# Patient Record
Sex: FEMALE | Race: WHITE | NOT HISPANIC OR LATINO | Employment: FULL TIME | URBAN - METROPOLITAN AREA
[De-identification: names, ages, dates, MRNs, and addresses within clinical notes are randomized per-mention and may not be internally consistent; named-entity substitution may affect disease eponyms.]

---

## 2023-03-08 ENCOUNTER — LAB REQUISITION (OUTPATIENT)
Dept: LAB | Facility: CLINIC | Age: 33
End: 2023-03-08
Payer: COMMERCIAL

## 2023-03-08 DIAGNOSIS — Z01.419 ENCOUNTER FOR GYNECOLOGICAL EXAMINATION (GENERAL) (ROUTINE) WITHOUT ABNORMAL FINDINGS: ICD-10-CM

## 2023-03-08 PROCEDURE — 87624 HPV HI-RISK TYP POOLED RSLT: CPT | Mod: ORL | Performed by: NURSE PRACTITIONER

## 2023-03-08 PROCEDURE — G0145 SCR C/V CYTO,THINLAYER,RESCR: HCPCS | Mod: ORL | Performed by: NURSE PRACTITIONER

## 2023-03-10 LAB
BKR LAB AP GYN ADEQUACY: NORMAL
BKR LAB AP GYN INTERPRETATION: NORMAL
BKR LAB AP HPV REFLEX: NORMAL
BKR LAB AP LMP: NORMAL
BKR LAB AP PREVIOUS ABNL DX: NORMAL
BKR LAB AP PREVIOUS ABNORMAL: NORMAL
PATH REPORT.COMMENTS IMP SPEC: NORMAL
PATH REPORT.COMMENTS IMP SPEC: NORMAL
PATH REPORT.RELEVANT HX SPEC: NORMAL

## 2023-03-14 LAB
HUMAN PAPILLOMA VIRUS 16 DNA: NEGATIVE
HUMAN PAPILLOMA VIRUS 18 DNA: NEGATIVE
HUMAN PAPILLOMA VIRUS FINAL DIAGNOSIS: NORMAL
HUMAN PAPILLOMA VIRUS OTHER HR: NEGATIVE

## 2023-10-16 ENCOUNTER — OFFICE VISIT (OUTPATIENT)
Dept: URGENT CARE | Facility: URGENT CARE | Age: 33
End: 2023-10-16
Payer: COMMERCIAL

## 2023-10-16 VITALS
TEMPERATURE: 98.6 F | WEIGHT: 160 LBS | SYSTOLIC BLOOD PRESSURE: 120 MMHG | RESPIRATION RATE: 16 BRPM | HEART RATE: 97 BPM | DIASTOLIC BLOOD PRESSURE: 76 MMHG | BODY MASS INDEX: 24.25 KG/M2 | OXYGEN SATURATION: 98 % | HEIGHT: 68 IN

## 2023-10-16 DIAGNOSIS — J01.40 ACUTE NON-RECURRENT PANSINUSITIS: Primary | ICD-10-CM

## 2023-10-16 PROCEDURE — 99203 OFFICE O/P NEW LOW 30 MIN: CPT | Performed by: PHYSICIAN ASSISTANT

## 2023-10-16 RX ORDER — FLUTICASONE PROPIONATE 50 MCG
1 SPRAY, SUSPENSION (ML) NASAL DAILY
Qty: 18.2 ML | Refills: 0 | Status: SHIPPED | OUTPATIENT
Start: 2023-10-16

## 2023-10-16 RX ORDER — DOXYCYCLINE 100 MG/1
100 CAPSULE ORAL 2 TIMES DAILY
Qty: 20 CAPSULE | Refills: 0 | Status: SHIPPED | OUTPATIENT
Start: 2023-10-16 | End: 2023-10-26

## 2023-10-16 RX ORDER — BENZONATATE 200 MG/1
200 CAPSULE ORAL 3 TIMES DAILY PRN
Qty: 21 CAPSULE | Refills: 0 | Status: SHIPPED | OUTPATIENT
Start: 2023-10-16

## 2023-10-16 NOTE — PROGRESS NOTES
Acute non-recurrent pansinusitis  - doxycycline hyclate (VIBRAMYCIN) 100 MG capsule; Take 1 capsule (100 mg) by mouth 2 times daily for 10 days  - fluticasone (FLONASE) 50 MCG/ACT nasal spray; Spray 1 spray into both nostrils daily  - benzonatate (TESSALON) 200 MG capsule; Take 1 capsule (200 mg) by mouth 3 times daily as needed for cough    Age 12 months or more  Okay to use Zarbee's   Okay to use Rx Children Tylenol if prescribed (Dose based on weight)    Age 2-12:   Okay to use Children Motrin or Tylenol over the counter.    Adults:  Okay to take acetaminophen 500 mg- 2 tabs (Total of 1000 mg) every 8 hrs   Okay to take ibuprofen 200 mg- 3 tabs (Total of 600 mg) every 6 hours        Okay to use Neti pot for sinus lavage up to three times daily for congestion and sinus pressure if present. Daily hot shower can be beneficial for congestion and body aches. Okay to use bedroom vaporizer or humidifier if symptoms are worse at night. Nightly Vicks Vapor rub and 5-10 mg of Melatonin okay to use for sleep.     Over the counter cough medication and decongestants okay if not prescribed by me during this visit. For homeopathic alternatives to cough syrup and decongestant, feel free to try Elderberry extract.    Okay to use salt water gargles, warm tea (or warm water with lemon and honey), and lozenges for any throat discomfort. Chloraseptic spray is also highly encourages for throat pain/irritation.     Patient will need to get plenty of rest and drink at least 1.5-2 liters of fluids daily for adults and 1-1.5 liters for children. If vomiting and not tolerating liquids for more than 24 hrs, please go to your nearest emergency department for IV fluids and further treatment.     Patient is not contagious after 1 week from start of symptoms. If possible, wear mask for first 7 days. Wash hands regularly and vigorously for 30 seconds often.     Patient was advised to return to clinic for reevaluation (either UC or PCP) if  symptoms do not improve in 5 days. Patient educated on red flag symptoms and asked to go directly to the ED if these symptoms present themselves.       YISEL Mcintosh [facility]    Subjective   33 year old who presents to clinic today for the following health issues:    Urgent Care and URI       HPI     Acute Illness  Acute illness concerns: Sick since end of August but could be multiple illnesses since then. Coughing, congestion, sore throat on and off. Over past week head congestion, productive cough.   Onset/Duration: has been feeling sick off and on since August- Has been more sick in the last 10 days   Symptoms:  Fever: No  Chills/Sweats: No  Headache (location?): No  Sinus Pressure: YES  Conjunctivitis:  No  Ear Pain: YES: bilateral ear pressure but no pain   Rhinorrhea: YES  Congestion: YES  Sore Throat: No- not currently but this has been off and on over the last couple of weeks   Cough: YES  Wheeze: No  Decreased Appetite: No  Nausea: No  Vomiting: No  Diarrhea: No  Dysuria/Freq.: No  Dysuria or Hematuria: No  Fatigue/Achiness: Possibly fatigue but no body aches.   Sick/Strep Exposure: Possibly her child  Therapies tried and outcome: Dayquil and Nyquil    Review of Systems   Review of Systems   See HPI    Objective    Temp: 98.6  F (37  C) Temp src: Temporal BP: 120/76 Pulse: 97   Resp: 16 SpO2: 98 %       Physical Exam   Physical Exam  Constitutional:       General: She is not in acute distress.     Appearance: Normal appearance. She is normal weight. She is not ill-appearing, toxic-appearing or diaphoretic.   HENT:      Head: Normocephalic and atraumatic.      Right Ear: Tympanic membrane, ear canal and external ear normal. There is no impacted cerumen.      Left Ear: Tympanic membrane, ear canal and external ear normal. There is no impacted cerumen.      Nose: Congestion and rhinorrhea present.      Right Sinus: Maxillary sinus tenderness and frontal sinus tenderness present.       Left Sinus: Maxillary sinus tenderness and frontal sinus tenderness present.      Mouth/Throat:      Mouth: Mucous membranes are moist.      Pharynx: No oropharyngeal exudate or posterior oropharyngeal erythema.   Cardiovascular:      Rate and Rhythm: Normal rate and regular rhythm.      Pulses: Normal pulses.      Heart sounds: Normal heart sounds. No murmur heard.     No friction rub. No gallop.   Pulmonary:      Effort: Pulmonary effort is normal. No respiratory distress.      Breath sounds: No stridor. No wheezing, rhonchi or rales.   Chest:      Chest wall: No tenderness.   Lymphadenopathy:      Cervical: No cervical adenopathy.   Neurological:      General: No focal deficit present.      Mental Status: She is alert and oriented to person, place, and time. Mental status is at baseline.      Gait: Gait normal.   Psychiatric:         Mood and Affect: Mood normal.         Behavior: Behavior normal.         Thought Content: Thought content normal.         Judgment: Judgment normal.          No results found for this or any previous visit (from the past 24 hour(s)).

## 2024-05-19 ENCOUNTER — HEALTH MAINTENANCE LETTER (OUTPATIENT)
Age: 34
End: 2024-05-19

## 2025-03-06 ENCOUNTER — LAB REQUISITION (OUTPATIENT)
Dept: LAB | Facility: CLINIC | Age: 35
End: 2025-03-06

## 2025-03-06 DIAGNOSIS — O20.9 HEMORRHAGE IN EARLY PREGNANCY, UNSPECIFIED: ICD-10-CM

## 2025-03-06 LAB
ABO + RH BLD: NORMAL
BLD GP AB SCN SERPL QL: NEGATIVE
HCG INTACT+B SERPL-ACNC: 276 MIU/ML
HGB BLD-MCNC: 12.6 G/DL (ref 11.7–15.7)
SPECIMEN EXP DATE BLD: NORMAL

## 2025-03-06 PROCEDURE — 86900 BLOOD TYPING SEROLOGIC ABO: CPT | Performed by: OBSTETRICS & GYNECOLOGY

## 2025-03-06 PROCEDURE — 85018 HEMOGLOBIN: CPT | Performed by: OBSTETRICS & GYNECOLOGY

## 2025-03-06 PROCEDURE — 84702 CHORIONIC GONADOTROPIN TEST: CPT | Performed by: OBSTETRICS & GYNECOLOGY

## 2025-03-10 ENCOUNTER — LAB REQUISITION (OUTPATIENT)
Dept: LAB | Facility: CLINIC | Age: 35
End: 2025-03-10

## 2025-03-10 DIAGNOSIS — O20.9 HEMORRHAGE IN EARLY PREGNANCY, UNSPECIFIED: ICD-10-CM

## 2025-03-10 LAB — HCG INTACT+B SERPL-ACNC: 164 MIU/ML

## 2025-03-10 PROCEDURE — 84702 CHORIONIC GONADOTROPIN TEST: CPT | Performed by: OBSTETRICS & GYNECOLOGY

## 2025-04-21 ENCOUNTER — LAB REQUISITION (OUTPATIENT)
Dept: LAB | Facility: CLINIC | Age: 35
End: 2025-04-21

## 2025-04-21 DIAGNOSIS — Z32.01 ENCOUNTER FOR PREGNANCY TEST, RESULT POSITIVE: ICD-10-CM

## 2025-04-21 PROCEDURE — 84702 CHORIONIC GONADOTROPIN TEST: CPT | Performed by: OBSTETRICS & GYNECOLOGY

## 2025-04-22 LAB — HCG INTACT+B SERPL-ACNC: 181 MIU/ML

## 2025-04-23 ENCOUNTER — LAB REQUISITION (OUTPATIENT)
Dept: LAB | Facility: CLINIC | Age: 35
End: 2025-04-23

## 2025-04-23 DIAGNOSIS — Z32.01 ENCOUNTER FOR PREGNANCY TEST, RESULT POSITIVE: ICD-10-CM

## 2025-04-23 LAB — HCG INTACT+B SERPL-ACNC: 384 MIU/ML

## 2025-04-23 PROCEDURE — 84702 CHORIONIC GONADOTROPIN TEST: CPT | Performed by: OBSTETRICS & GYNECOLOGY

## 2025-05-20 ENCOUNTER — LAB REQUISITION (OUTPATIENT)
Dept: LAB | Facility: CLINIC | Age: 35
End: 2025-05-20

## 2025-05-20 DIAGNOSIS — Z36.89 ENCOUNTER FOR OTHER SPECIFIED ANTENATAL SCREENING: ICD-10-CM

## 2025-05-20 PROCEDURE — 87086 URINE CULTURE/COLONY COUNT: CPT | Performed by: NURSE PRACTITIONER

## 2025-05-21 LAB — BACTERIA UR CULT: NO GROWTH

## 2025-06-08 ENCOUNTER — HEALTH MAINTENANCE LETTER (OUTPATIENT)
Age: 35
End: 2025-06-08

## 2025-06-19 ENCOUNTER — LAB REQUISITION (OUTPATIENT)
Dept: LAB | Facility: CLINIC | Age: 35
End: 2025-06-19

## 2025-06-19 DIAGNOSIS — Z36.89 ENCOUNTER FOR OTHER SPECIFIED ANTENATAL SCREENING: ICD-10-CM

## 2025-06-19 LAB
ABO + RH BLD: NORMAL
BASOPHILS # BLD AUTO: 0 10E3/UL (ref 0–0.2)
BASOPHILS NFR BLD AUTO: 0 %
BLD GP AB SCN SERPL QL: NEGATIVE
EOSINOPHIL # BLD AUTO: 0.3 10E3/UL (ref 0–0.7)
EOSINOPHIL NFR BLD AUTO: 3 %
ERYTHROCYTE [DISTWIDTH] IN BLOOD BY AUTOMATED COUNT: 12.3 % (ref 10–15)
EST. AVERAGE GLUCOSE BLD GHB EST-MCNC: 103 MG/DL
HBA1C MFR BLD: 5.2 %
HCT VFR BLD AUTO: 34.3 % (ref 35–47)
HCV AB SERPL QL IA: NONREACTIVE
HGB BLD-MCNC: 11.5 G/DL (ref 11.7–15.7)
HIV 1+2 AB+HIV1 P24 AG SERPL QL IA: NONREACTIVE
IMM GRANULOCYTES # BLD: 0.1 10E3/UL
IMM GRANULOCYTES NFR BLD: 1 %
LYMPHOCYTES # BLD AUTO: 1.7 10E3/UL (ref 0.8–5.3)
LYMPHOCYTES NFR BLD AUTO: 16 %
MCH RBC QN AUTO: 29.9 PG (ref 26.5–33)
MCHC RBC AUTO-ENTMCNC: 33.5 G/DL (ref 31.5–36.5)
MCV RBC AUTO: 89 FL (ref 78–100)
MONOCYTES # BLD AUTO: 0.6 10E3/UL (ref 0–1.3)
MONOCYTES NFR BLD AUTO: 6 %
NEUTROPHILS # BLD AUTO: 7.7 10E3/UL (ref 1.6–8.3)
NEUTROPHILS NFR BLD AUTO: 74 %
NRBC # BLD AUTO: 0 10E3/UL
NRBC BLD AUTO-RTO: 0 /100
PLATELET # BLD AUTO: 288 10E3/UL (ref 150–450)
RBC # BLD AUTO: 3.85 10E6/UL (ref 3.8–5.2)
SPECIMEN EXP DATE BLD: NORMAL
WBC # BLD AUTO: 10.4 10E3/UL (ref 4–11)

## 2025-06-19 PROCEDURE — 87389 HIV-1 AG W/HIV-1&-2 AB AG IA: CPT | Performed by: OBSTETRICS & GYNECOLOGY

## 2025-06-19 PROCEDURE — 83036 HEMOGLOBIN GLYCOSYLATED A1C: CPT | Performed by: OBSTETRICS & GYNECOLOGY

## 2025-06-19 PROCEDURE — 85025 COMPLETE CBC W/AUTO DIFF WBC: CPT | Performed by: OBSTETRICS & GYNECOLOGY

## 2025-06-19 PROCEDURE — 86803 HEPATITIS C AB TEST: CPT | Performed by: OBSTETRICS & GYNECOLOGY

## 2025-06-19 PROCEDURE — 86901 BLOOD TYPING SEROLOGIC RH(D): CPT | Performed by: OBSTETRICS & GYNECOLOGY

## 2025-06-25 ENCOUNTER — TRANSCRIBE ORDERS (OUTPATIENT)
Dept: MATERNAL FETAL MEDICINE | Facility: CLINIC | Age: 35
End: 2025-06-25
Payer: COMMERCIAL

## 2025-06-25 DIAGNOSIS — O26.90 PREGNANCY RELATED CONDITION, ANTEPARTUM: Primary | ICD-10-CM

## 2025-07-23 ENCOUNTER — PRE VISIT (OUTPATIENT)
Dept: MATERNAL FETAL MEDICINE | Facility: HOSPITAL | Age: 35
End: 2025-07-23
Payer: COMMERCIAL

## 2025-07-28 ENCOUNTER — ANCILLARY PROCEDURE (OUTPATIENT)
Dept: ULTRASOUND IMAGING | Facility: HOSPITAL | Age: 35
End: 2025-07-28
Attending: OBSTETRICS & GYNECOLOGY
Payer: COMMERCIAL

## 2025-07-28 ENCOUNTER — OFFICE VISIT (OUTPATIENT)
Dept: MATERNAL FETAL MEDICINE | Facility: HOSPITAL | Age: 35
End: 2025-07-28
Attending: OBSTETRICS & GYNECOLOGY
Payer: COMMERCIAL

## 2025-07-28 DIAGNOSIS — Z82.79 FAMILY HISTORY OF CONGENITAL HEART DEFECT: Primary | ICD-10-CM

## 2025-07-28 DIAGNOSIS — Z36.3 ENCOUNTER FOR ROUTINE SCREENING FOR MALFORMATION USING ULTRASONICS: ICD-10-CM

## 2025-07-28 DIAGNOSIS — O26.90 PREGNANCY RELATED CONDITION, ANTEPARTUM: ICD-10-CM

## 2025-07-28 DIAGNOSIS — O09.512 AMA (ADVANCED MATERNAL AGE) PRIMIGRAVIDA 35+, SECOND TRIMESTER: Primary | ICD-10-CM

## 2025-07-28 PROCEDURE — 99207 PR NO CHARGE LOS: CPT | Performed by: OBSTETRICS & GYNECOLOGY

## 2025-07-28 PROCEDURE — 76811 OB US DETAILED SNGL FETUS: CPT

## 2025-07-28 PROCEDURE — 76811 OB US DETAILED SNGL FETUS: CPT | Mod: 26 | Performed by: OBSTETRICS & GYNECOLOGY

## 2025-07-28 PROCEDURE — 96041 GENETIC COUNSELING SVC EA 30: CPT | Performed by: GENETIC COUNSELOR, MS

## 2025-07-28 NOTE — PROGRESS NOTES
Please refer to ultrasound report under 'Imaging' Studies of 'Chart Review' tabs.    Raymon Lee M.D.

## 2025-07-28 NOTE — NURSING NOTE
Ela ANAMIKA Mccloud is a  at 18w1d who presents to [facility] for GC and L2 ultrasound. Pt reports positive fetal movement. Pt denies bldg/lof/change in discharge, contractions, headache, vision changes, chest pain/SOB or edema. SBAR given to Dr. Lee, see note in Epic.

## 2025-07-28 NOTE — PROGRESS NOTES
"[facility]  Genetic Counseling Consult    Patient:  Ela Mccloud  Preferred Name: Ela YOB: 1990   Date of Service:  25   MRN: [id number]    Ela was seen at the [facility] [place] for genetic consultation. The indication for genetic counseling is advanced maternal age and family history concern. The patient was accompanied to this visit by their partner, Jose.    The session was conducted in English.      IMPRESSION/ PLAN   1. Ela had genetic screening earlier in this pregnancy. Their non-invasive prenatal test was screen negative or low risk for screened conditions     2. During today's [facility] visit, Ela had a genetic counseling session only. Ela has already had genetic testing in this pregnancy. Additional screening and diagnostic testing was discussed for the gestational age and declined.    3. Ela had a level II comprehensive anatomy ultrasound today. Please see the ultrasound report for further details.    PREGNANCY HISTORY   /Parity:       Ela's pregnancy history is significant for:   10/2021: Term c section, female, no complications per patient report  1 1st trimester SAB    CURRENT PREGNANCY   Current Age: 35 year old   Age at Delivery: 35 year old  JOEL: 2025, by Last Menstrual Period                                   Gestational Age: 18w1d  This pregnancy is a single gestation.   This pregnancy was conceived spontaneously.    MEDICAL HISTORY   Ela s reported medical history is not expected to impact pregnancy management or risks to fetal development.       FAMILY HISTORY   A three-generation pedigree was obtained today and is scanned under the \"Media\" tab in Epic. The family history was reported by Ela and their partner.    The following significant findings were reported today:   The father of the pregnancy, Jose, 45, is healthy.   Ela and Jose have one " healthy daughter    Ela's sister  at 11 days old from hypoplastic left heart syndrome  Congenital heart defects can be isolated or associated with multiple birth defects (syndromic).There are many genetic syndromes, single gene disorders or chromosomal abnormalities, that can be associated with congenital heart defects. Isolated congenital heart defects are usually a multi-factorial condition caused by the combination of genetic and environmental factors and familial clustering is not uncommon. Since there is a genetic component to multi-factorial conditions, the recurrence risk is higher for first degree relatives (siblings) than in second degree relatives and decreases with distance in relationship.    We discussed that congenital heart defects are common, occurring in 0.5 to 1.0% live births. The specific recurrence risk for first degree relatives differs according to the type of congenital heart defect and if there is an associated genetic syndrome present. In general, studies show that the overall risk contributed by a positive family history of a congenital heart defect in 1st degree relatives for the same defect is 8.15% whereas the recurrence risk for a different heart defect is 2.68%. There are also recurrence risks specific to the heart defect. Often risks for second degree relatives are not available. Furthermore, for third degree relatives the risk is likely equal to general population risks.     Hypoplastic left heart syndrome (HLHS) is an underdevelopment of the heart's left side including the aorta, aortic valve, left ventricle, and the mitral valve. Since the left side of the heart is responsible for receiving oxygen-rich blood from the lungs and then pumping it out to the body through the aorta the baby may have significant problems breathing. This defect can be fatal for this reason. Significant multi-stage surgery is often needed. The risk for hypoplastic left heart sydnrome in a baby is  "dependent on the affected relative: 8.0% for first degree relatives     This pregnancy is a second degree relative to the family member with a hypoplastic left heart syndrome; therefore, the recurrence risk is likely elevated from general population but not significantly. A fetal echocardiogram is often recommended for pregnancies of a first degree relation to a family member with a congenital heart defect. Fetal echocardiograms can also be recommended, in the absence of family history, due to maternal diabetes, an IVF pregnancy, or suspected heart defect on comprehensive ultrasound, among other reasons.     Jose's maternal uncle and aunt both had major cardiac surgery for a \"thinning aorta\". Jose describes the problem as being in the heart rather than in the chest or abdominal area. Per the description, it could have been an aortic aneurysm or coarctation of the aorta. Jose's mother and himself have had normal echocardiogram. He does not believe his aunt and uncle had genetic testing. He was encouraged to ask them if they would ever consider genetic testing. If a familial risk factor was found for their condition then family members could be tested to determine if they too are at risk.     Otherwise, the reported family history is unremarkable for multiple miscarriages, stillbirths, birth defects, intellectual disabilities, known genetic conditions, and consanguinity.       RISK ASSESSMENT FOR INHERITED CONDITIONS AND CARRIER SCREENING OPTIONS   Expanded carrier screening is available to screen for autosomal recessive conditions and X-linked conditions in a large list of genes. Carrier screening does not test the pregnancy but gives a risk assessment for the pregnancy and future pregnancies to have the condition. Expanded carrier screening is designed to identify carrier status for conditions that are primarily childhood or adolescent onset. Expanded carrier screening does not evaluate for adult-onset conditions " such as hereditary cancer syndromes, dementia/ Alzheimer's disease, or cardiovascular disease risk factors. Additionally, expanded carrier screening is not comprehensive for all known genetic diseases or inherited conditions. Carrier screening does not test for all genetic and health conditions or risk factors.     Autosomal recessive conditions happen when a mutation has been inherited from the egg and sperm and include conditions like cystic fibrosis, thalassemia, hearing loss, spinal muscular atrophy, and more. We reviewed that when both biological parents carry a harmful genetic change in a gene associated with autosomal recessive inheritance, each of their pregnancies has a 1 in 4 (25%) chance to be affected by that condition. X-linked conditions happen when a mutation has been inherited from the egg and include conditions like fragile X syndrome.With x-linked conditions, the specific risk generally depends on the chromosomal sex of the fetus, with XY individuals (generally male) being most severely affected.      screening was reviewed. About [place] Screening    The patient does NOT have a family history of known inherited conditions. This does NOT mean the patient and/or their partner is not a carrier of a condition. Approximately 90% of couples at an increased reproductive risk for an inherited condition have no family history of that condition.     Ela and Jose did carrier screening in their last pregnancy. The results were not available for review but they do not recall any significant results. We briefly discussed carrier screening panels have expanded in the last few years and while more testing is an option, they declined today.     RISK ASSESSMENT FOR CHROMOSOME CONDITIONS   We explained that the risk for fetal chromosome abnormalities increases with maternal age. We discussed specific features of common chromosome abnormalities, including trisomy 21 (Down syndrome), trisomy 13, trisomy  18, and sex chromosome trisomies.    At age 36 at midtrimester, the risk to have a baby with Down syndrome is 1 in 129.   At age 36 at midtrimester, the risk to have a baby with any chromosome abnormality is 1 in 65.     Ela had genetic screening earlier in this pregnancy. Their non-invasive prenatal test was screen negative or low risk for screened conditions     Non-invasive prenatal testing (NIPT) results  Maternal plasma cell-free DNA testing  Screens for fetal trisomy 21, trisomy 13, trisomy 18, and sex chromosome aneuploidy  First trimester ultrasound with nuchal translucency and nasal bone assessment was not performed in this pregnancy, to our knowledge.  Ela had a Panorama test earlier in pregnancy; we reviewed the results today, which are low risk.  The NIPT did include sex chromosome aneuploidies and the result was low risk. The predicted sex is XX, which is typically female.  Given the accuracy of this test, these results greatly decrease the chance for certain fetal chromosome abnormalities  We discussed the limitations of normal NIPT results  Maternal serum AFP only to screen for open neural tube defects (after 15 weeks) was not performed in this pregnancy, to our knowledge.     GENETIC TESTING OPTIONS   Genetic testing during a pregnancy includes screening and diagnostic procedures.      Screening tests are non-invasive which means no risk to the pregnancy and includes ultrasounds and blood work. The benefits and limitations of screening were reviewed. Screening tests provide a risk assessment (chance) specific to the pregnancy for certain fetal chromosome abnormalities but cannot definitively diagnose or exclude a fetal chromosome abnormality. Follow-up genetic counseling and consideration of diagnostic testing is recommended with any abnormal screening result. Diagnostic testing during a pregnancy is more certain and can test for more conditions. However, the tests do have a risk of miscarriage  that requires careful consideration. These tests can detect fetal chromosome abnormalities with greater than 99% certainty. Results can be compromised by maternal cell contamination or mosaicism and are limited by the resolution of current genetic testing technology.     There is no screening or diagnostic test that detects all forms of birth defects or intellectual disability.     We discussed the following screening options:     Non-invasive prenatal testing (NIPT)  Also called cell-free DNA screening because it detects chromosomes from the placenta in the pregnant person's blood  Can be done any time after 10 weeks gestation  Standard recommendation for NIPT screens for trisomy 21, trisomy 18, trisomy 13, with the option of adding sex chromosome aneuploidies, without or without predicted sex  Cannot screen for open neural tube defects, maternal serum AFP after 15 weeks is recommended  New NIPT options include screening for other trisomies, microdeletion syndromes, and in some cases fetal blood antigens. Guidelines do not recommend these conditions are included in standard screening. These options have limitations and should be discussed with a genetic counselor.   However, current (2023) ACMG guidelines do recommend that screening for one microdeletion syndrome, called 22q11.2 deletion syndrome be offered to all pregnant patients. 22q11.2 deletion syndrome has an estimated prevalence of 1 in 990 to 1 in 2148 (0.05-0.1%). Risk is not thought to increase with maternal age. Clinical features are variable but include congenital heart defects, cleft palate, developmental delays, immune system deficiencies, and hearing loss. Approximately 90% of cases are de berto (a sporadic new change in a pregnancy). Cell-free DNA screening for 22q11.2 deletion syndrome is available with the inclusion of other microdeletion syndromes. There is less data about the performance of cell-free DNA screening for more rare microdeletions and  the chance for false positives or negative may be increased.  We discussed the limitations of cell-free DNA screening in detecting microdeletions and the possibility of false positives and false negatives.     We discussed the following ultrasound options:  Comprehensive level II ultrasound (Fetal Anatomy Ultrasound)  Ultrasound done between 18-20 weeks gestation  Screens for major birth defects and markers for aneuploidy (like trisomy 21 and trisomy 18)  Includes looking at the fetus/baby's growth, heart, organs (stomach, kidneys), placenta, and amniotic fluid    We discussed the following diagnostic options:   Amniocentesis  Invasive diagnostic procedure done after 15 weeks gestation  The procedure collects a small sample of amniotic fluid for the purpose of chromosomal testing and/or other genetic testing  Diagnostic result; more than 99% sensitivity for fetal chromosome abnormalities  Testing for AFP in the amniotic fluid can test for open neural tube defects    It was a pleasure to be involved with Ela s care. I spent 45 minutes on the date of the encounter doing chart review, obtaining history, test coordination, documentation, and further activities as noted above.    Lashonda Caceres, WILLIAMS, MS, [facility]  Board Certified and [place] Licensed Genetic Counselor  [facility]  Maternal Fetal Medicine  Office: [phone]  [facility]: [phone]   Fax: [phone]  [facility]